# Patient Record
Sex: MALE | Race: WHITE | URBAN - METROPOLITAN AREA
[De-identification: names, ages, dates, MRNs, and addresses within clinical notes are randomized per-mention and may not be internally consistent; named-entity substitution may affect disease eponyms.]

---

## 2023-11-15 PROBLEM — H61.23 IMPACTED CERUMEN OF BOTH EARS: Status: ACTIVE | Noted: 2023-11-15

## 2023-11-15 PROBLEM — E78.00 PURE HYPERCHOLESTEROLEMIA: Status: ACTIVE | Noted: 2023-11-15

## 2023-11-15 NOTE — PROGRESS NOTES
No oropharyngeal exudate or posterior oropharyngeal erythema. Eyes:      Extraocular Movements: Extraocular movements intact. Pupils: Pupils are equal, round, and reactive to light. Cardiovascular:      Rate and Rhythm: Normal rate and regular rhythm. Pulses: Normal pulses. Pulmonary:      Effort: Pulmonary effort is normal.      Breath sounds: Normal breath sounds. Abdominal:      General: Abdomen is flat. Palpations: Abdomen is soft. There is no mass. Musculoskeletal:         General: Normal range of motion. Cervical back: Neck supple. No tenderness. Skin:     General: Skin is warm and dry. Comments: Dry spot with no pigment on the right alar plate of the nose. Slightly scaly no vascularity   Neurological:      General: No focal deficit present. Mental Status: He is alert and oriented to person, place, and time. Psychiatric:         Mood and Affect: Mood normal.         Behavior: Behavior normal.         Thought Content: Thought content normal.         Judgment: Judgment normal.                 Please note that parts of this chart were generated using voice recognition Dragon dictation software. Although every effort was made to ensure the accuracy of this automated transcription, some errors in transcription may have occurred.     Electronically signed by Clementina Wright MD on 11/20/2023 at 10:19 AM

## 2023-11-20 ENCOUNTER — PATIENT MESSAGE (OUTPATIENT)
Age: 63
End: 2023-11-20

## 2023-11-20 ENCOUNTER — OFFICE VISIT (OUTPATIENT)
Age: 63
End: 2023-11-20
Payer: COMMERCIAL

## 2023-11-20 VITALS
HEIGHT: 70 IN | RESPIRATION RATE: 16 BRPM | TEMPERATURE: 97.7 F | WEIGHT: 177 LBS | HEART RATE: 77 BPM | BODY MASS INDEX: 25.34 KG/M2 | SYSTOLIC BLOOD PRESSURE: 113 MMHG | DIASTOLIC BLOOD PRESSURE: 65 MMHG

## 2023-11-20 DIAGNOSIS — Z00.00 VISIT FOR WELL MAN HEALTH CHECK: ICD-10-CM

## 2023-11-20 DIAGNOSIS — Z23 NEED FOR IMMUNIZATION AGAINST INFLUENZA: Primary | ICD-10-CM

## 2023-11-20 DIAGNOSIS — E78.00 PURE HYPERCHOLESTEROLEMIA: ICD-10-CM

## 2023-11-20 DIAGNOSIS — Z12.11 SCREEN FOR COLON CANCER: ICD-10-CM

## 2023-11-20 PROCEDURE — 90471 IMMUNIZATION ADMIN: CPT | Performed by: FAMILY MEDICINE

## 2023-11-20 PROCEDURE — 90686 IIV4 VACC NO PRSV 0.5 ML IM: CPT | Performed by: FAMILY MEDICINE

## 2023-11-20 PROCEDURE — 99396 PREV VISIT EST AGE 40-64: CPT | Performed by: FAMILY MEDICINE

## 2023-11-20 PROCEDURE — 99212 OFFICE O/P EST SF 10 MIN: CPT | Performed by: FAMILY MEDICINE

## 2023-11-20 RX ORDER — ATORVASTATIN CALCIUM 20 MG/1
20 TABLET, FILM COATED ORAL DAILY
Qty: 90 TABLET | Refills: 3 | Status: SHIPPED | OUTPATIENT
Start: 2023-11-20 | End: 2023-11-21 | Stop reason: SDUPTHER

## 2023-11-20 RX ORDER — ATORVASTATIN CALCIUM 20 MG/1
20 TABLET, FILM COATED ORAL DAILY
COMMUNITY
End: 2023-11-20 | Stop reason: SDUPTHER

## 2023-11-20 SDOH — ECONOMIC STABILITY: FOOD INSECURITY: WITHIN THE PAST 12 MONTHS, YOU WORRIED THAT YOUR FOOD WOULD RUN OUT BEFORE YOU GOT MONEY TO BUY MORE.: NEVER TRUE

## 2023-11-20 SDOH — ECONOMIC STABILITY: HOUSING INSECURITY
IN THE LAST 12 MONTHS, WAS THERE A TIME WHEN YOU DID NOT HAVE A STEADY PLACE TO SLEEP OR SLEPT IN A SHELTER (INCLUDING NOW)?: NO

## 2023-11-20 SDOH — ECONOMIC STABILITY: INCOME INSECURITY: HOW HARD IS IT FOR YOU TO PAY FOR THE VERY BASICS LIKE FOOD, HOUSING, MEDICAL CARE, AND HEATING?: NOT HARD AT ALL

## 2023-11-20 SDOH — ECONOMIC STABILITY: FOOD INSECURITY: WITHIN THE PAST 12 MONTHS, THE FOOD YOU BOUGHT JUST DIDN'T LAST AND YOU DIDN'T HAVE MONEY TO GET MORE.: NEVER TRUE

## 2023-11-20 ASSESSMENT — ENCOUNTER SYMPTOMS
COUGH: 0
CHEST TIGHTNESS: 0
ABDOMINAL PAIN: 0
DIARRHEA: 0
BLOOD IN STOOL: 0
VOMITING: 0
CONSTIPATION: 0

## 2023-11-20 ASSESSMENT — PATIENT HEALTH QUESTIONNAIRE - PHQ9
SUM OF ALL RESPONSES TO PHQ QUESTIONS 1-9: 0
1. LITTLE INTEREST OR PLEASURE IN DOING THINGS: 0
SUM OF ALL RESPONSES TO PHQ QUESTIONS 1-9: 0
SUM OF ALL RESPONSES TO PHQ QUESTIONS 1-9: 0
SUM OF ALL RESPONSES TO PHQ9 QUESTIONS 1 & 2: 0
2. FEELING DOWN, DEPRESSED OR HOPELESS: 0
SUM OF ALL RESPONSES TO PHQ QUESTIONS 1-9: 0

## 2023-11-20 NOTE — TELEPHONE ENCOUNTER
From: Angie Luna  To: Dr. Nba Zee: 11/20/2023 11:20 AM EST  Subject: Need Atorvastatin Refill    Hi Dr. Claudia Herbert,    My 1 year prescription for Atorvastatin is up in a few days and I need this prescription renewed. I mentioned it to the nurse, but I don't think I mentioned it to you and do not see anything in my after visit notes about it. Please renew this prescription. (Or let me know if you did and sent it directly to my Pharmacy)    Thanks!     Lb Fisher

## 2023-11-20 NOTE — PATIENT INSTRUCTIONS
I will see you back in 1 year. The cologuard test is every 3 years! Keep an eye on the lesion on your nose any itching or bleeding or enlargement, let me know. Next year we will repeat all your lab tests. I will report back to you once I get lab results this year. Please remember to send me a note through [facility]DA when you have the lab done.

## 2023-11-21 RX ORDER — ATORVASTATIN CALCIUM 20 MG/1
20 TABLET, FILM COATED ORAL DAILY
Qty: 90 TABLET | Refills: 3 | Status: SHIPPED | OUTPATIENT
Start: 2023-11-21

## 2023-11-29 DIAGNOSIS — E78.00 PURE HYPERCHOLESTEROLEMIA: ICD-10-CM

## 2023-12-03 LAB — NONINV COLON CA DNA+OCC BLD SCRN STL QL: NEGATIVE

## 2024-02-06 ENCOUNTER — OFFICE VISIT (OUTPATIENT)
Age: 64
End: 2024-02-06
Payer: COMMERCIAL

## 2024-02-06 VITALS
WEIGHT: 175 LBS | SYSTOLIC BLOOD PRESSURE: 138 MMHG | BODY MASS INDEX: 25.11 KG/M2 | DIASTOLIC BLOOD PRESSURE: 71 MMHG | TEMPERATURE: 98.1 F | RESPIRATION RATE: 16 BRPM

## 2024-02-06 DIAGNOSIS — H61.23 IMPACTED CERUMEN OF BOTH EARS: ICD-10-CM

## 2024-02-06 DIAGNOSIS — E78.00 PURE HYPERCHOLESTEROLEMIA: ICD-10-CM

## 2024-02-06 DIAGNOSIS — G93.31 POST VIRAL SYNDROME: Primary | ICD-10-CM

## 2024-02-06 DIAGNOSIS — Z78.9 NONSMOKER: ICD-10-CM

## 2024-02-06 PROCEDURE — 99212 OFFICE O/P EST SF 10 MIN: CPT

## 2024-02-06 PROCEDURE — 69210 REMOVE IMPACTED EAR WAX UNI: CPT

## 2024-02-06 NOTE — PATIENT INSTRUCTIONS
Follow up in 2 weeks Post Viral Sinusitis  If you continue to have congestion and the over the counter medications are not working please call and let us know   I would recommend saline nasal flush and rinse out the nasal sinus  Refrain from using the sudafed as this can dry out the mucus and make it worse.   Flushed your ears today. Post flush ears look great  Please call if you have any questions  Thank you for your visit today!

## 2024-02-06 NOTE — PROGRESS NOTES
Procedure Documentation:  Procedure:     Ear Irrigation/instrumented removal of cerumen   Location:       bilateral Ear Canals  Reason:         Impacted Wax/Cerumen  Note:             Patients ear(s) was/were irrigated with warm tap water and peroxide using Waterpik.   I was able to remove the wax/cerumen completely.   Patient tolerated the procedure well.  Pt instructions: continue current home treatments   Done by Vandana Russ MA   Checked by Dr Brothers.  
diaphoretic.   HENT:      Head: Normocephalic and atraumatic.      Right Ear: There is impacted cerumen.      Left Ear: There is impacted cerumen.      Ears:      Comments: Post irrigation, clear external canal with no erythema, there is very minimal fluid behind tympanic membrane is clear in nature.     Nose: No congestion or rhinorrhea.      Mouth/Throat:      Pharynx: No oropharyngeal exudate.   Eyes:      Extraocular Movements: Extraocular movements intact.      Conjunctiva/sclera: Conjunctivae normal.   Cardiovascular:      Rate and Rhythm: Normal rate.      Heart sounds: No murmur heard.     No friction rub. No gallop.   Pulmonary:      Breath sounds: Normal breath sounds. No wheezing, rhonchi or rales.   Musculoskeletal:      Right lower leg: No edema.      Left lower leg: No edema.   Neurological:      Mental Status: He is alert.   Psychiatric:         Mood and Affect: Mood normal.         Behavior: Behavior normal.         ASSESSMENT/PLAN     1. Post viral syndrome  2. Nonsmoker  3. Impacted cerumen of both ears  Follow up in 2 weeks Post Viral Sinusitis  If you continue to have congestion and the over the counter medications are not working please call and let us know   I would recommend saline nasal flush and rinse out the nasal sinus  Refrain from using the sudafed as this can dry out the mucus and make it worse.   Flushed your ears today. Post flush ears look great  Please call if you have any questions  Thank you for your visit today!    Return in about 2 weeks (around 2/20/2024) for Post Viral Sinusitis.    - Questions/concerns answered. Patient verbalized and expressed understanding. Medications, laboratory testing, imaging, consultation, and follow up as documented in this encounter.     Please be aware portions of this note were completed using voice recognition software and unforeseen errors may have occurred    Patient was discussed with Dr. Mckeon    Electronically signed by Bertin Brothers MD

## 2024-02-08 ASSESSMENT — ENCOUNTER SYMPTOMS
COUGH: 0
TROUBLE SWALLOWING: 0
VOICE CHANGE: 0
SINUS PAIN: 0
VOMITING: 0
DIARRHEA: 0
CONSTIPATION: 0
SINUS PRESSURE: 1
NAUSEA: 0
RHINORRHEA: 0
SHORTNESS OF BREATH: 0
SORE THROAT: 0
ABDOMINAL PAIN: 0
BLOOD IN STOOL: 0

## 2024-10-27 RX ORDER — ATORVASTATIN CALCIUM 20 MG/1
20 TABLET, FILM COATED ORAL DAILY
Qty: 90 TABLET | Refills: 3 | Status: SHIPPED | OUTPATIENT
Start: 2024-10-27

## 2025-01-02 PROBLEM — J30.9 ATOPIC RHINITIS: Status: ACTIVE | Noted: 2017-02-01

## 2025-01-02 RX ORDER — CETIRIZINE HYDROCHLORIDE 10 MG/1
10 TABLET, CHEWABLE ORAL DAILY
COMMUNITY

## 2025-01-02 NOTE — PROGRESS NOTES
[facility]  [address] [place] [address]  Phone: [phone]  Fax: [phone]      Date of Visit:  2025  Patient Name: Simon Loya   Patient :  1960     Assessment/Plan:     1. Pure hypercholesterolemia  2. Visit for well man health check       Plan:    BMI was elevated today, and weight loss plan recommended is lifestyle modification.            There are no Patient Instructions on file for this visit.     Requested Prescriptions      No prescriptions requested or ordered in this encounter       There are no discontinued medications.    No follow-ups on file.    Simon was given educational materials: See patient instructions. Discussed use, benefit, and side effects of prescribed medications.  Barriers to medication compliance addressed. All patient questions answered.  Pt voiced understanding.     Subjective:      HPI    Simon Loya is a 64 y.o. male with Hx of  has a past medical history of Adult acne, Allergic rhinitis, Dysuria, elevated blood pressure, Groin abscess, Hyperlipidemia, Inguinal ring laxity, Intermittent low back pain, and UTI (urinary tract infection). who presents to clinic with due to   Chief Complaint   Patient presents with    Annual Exam     PE-         Patient comes in for annual wellness exam.  He was seen in February of last year for impacted wax.    My last annual visit was 2023.  At that time we addressed hypercholesterolemia with lifestyle changes and did begin a cholesterol-lowering agent.  We also did screening for colon cancer.    Labs done 2023 showed glucose of 96, normal kidney function with BUN 17 creatinine 0.74 normal serum electrolytes EGFR was 107.    Patient has been retired for a few years continues to be active with dancing on a weekly basis.    Preventative Services:           The following portions of the patients's history were reviewed and updated as

## 2025-01-06 ENCOUNTER — OFFICE VISIT (OUTPATIENT)
Age: 65
End: 2025-01-06
Payer: COMMERCIAL

## 2025-01-06 VITALS
RESPIRATION RATE: 16 BRPM | TEMPERATURE: 98 F | DIASTOLIC BLOOD PRESSURE: 78 MMHG | HEART RATE: 70 BPM | BODY MASS INDEX: 25.91 KG/M2 | SYSTOLIC BLOOD PRESSURE: 151 MMHG | HEIGHT: 70 IN | WEIGHT: 181 LBS

## 2025-01-06 DIAGNOSIS — R03.0 ELEVATED BLOOD PRESSURE READING: ICD-10-CM

## 2025-01-06 DIAGNOSIS — E78.00 PURE HYPERCHOLESTEROLEMIA: ICD-10-CM

## 2025-01-06 DIAGNOSIS — Z23 NEED FOR TD VACCINE: Primary | ICD-10-CM

## 2025-01-06 DIAGNOSIS — Z00.00 VISIT FOR WELL MAN HEALTH CHECK: ICD-10-CM

## 2025-01-06 PROCEDURE — 90714 TD VACC NO PRESV 7 YRS+ IM: CPT | Performed by: FAMILY MEDICINE

## 2025-01-06 PROCEDURE — 99396 PREV VISIT EST AGE 40-64: CPT | Performed by: FAMILY MEDICINE

## 2025-01-06 PROCEDURE — 99212 OFFICE O/P EST SF 10 MIN: CPT | Performed by: FAMILY MEDICINE

## 2025-01-06 SDOH — ECONOMIC STABILITY: FOOD INSECURITY: WITHIN THE PAST 12 MONTHS, YOU WORRIED THAT YOUR FOOD WOULD RUN OUT BEFORE YOU GOT MONEY TO BUY MORE.: NEVER TRUE

## 2025-01-06 SDOH — ECONOMIC STABILITY: INCOME INSECURITY: HOW HARD IS IT FOR YOU TO PAY FOR THE VERY BASICS LIKE FOOD, HOUSING, MEDICAL CARE, AND HEATING?: NOT HARD AT ALL

## 2025-01-06 SDOH — ECONOMIC STABILITY: FOOD INSECURITY: WITHIN THE PAST 12 MONTHS, THE FOOD YOU BOUGHT JUST DIDN'T LAST AND YOU DIDN'T HAVE MONEY TO GET MORE.: NEVER TRUE

## 2025-01-06 ASSESSMENT — PATIENT HEALTH QUESTIONNAIRE - PHQ9
SUM OF ALL RESPONSES TO PHQ9 QUESTIONS 1 & 2: 0
SUM OF ALL RESPONSES TO PHQ QUESTIONS 1-9: 0
SUM OF ALL RESPONSES TO PHQ QUESTIONS 1-9: 0
2. FEELING DOWN, DEPRESSED OR HOPELESS: NOT AT ALL
SUM OF ALL RESPONSES TO PHQ QUESTIONS 1-9: 0
1. LITTLE INTEREST OR PLEASURE IN DOING THINGS: NOT AT ALL
SUM OF ALL RESPONSES TO PHQ QUESTIONS 1-9: 0

## 2025-01-06 NOTE — PATIENT INSTRUCTIONS
Great to see you today.  You did gain a few pounds from 175 to 181 pounds however your BMI is 26 so you still are in a good range.  This little bit of weight gain may have caused her pressure to go up as well as salt.  Please keep track of your pressures maybe once or twice a week and send me 3 or 4 measurements.    With you being low risk I did not order a PSA.  Continue ear care as you have been doing.    I will send you a note with lab results or give you a call if things are abnormal.    Patient Education        DASH Diet: Care Instructions  Your Care Instructions     The DASH diet is an eating plan that can help lower your blood pressure. DASH stands for Dietary Approaches to Stop Hypertension. Hypertension is high blood pressure.  The DASH diet focuses on eating foods that are high in calcium, potassium, and magnesium. These nutrients can lower blood pressure. The foods that are highest in these nutrients are fruits, vegetables, low-fat dairy products, nuts, seeds, and legumes. But taking calcium, potassium, and magnesium supplements instead of eating foods that are high in those nutrients does not have the same effect. The DASH diet also includes whole grains, fish, and poultry.  The DASH diet is one of several lifestyle changes your doctor may recommend to lower your high blood pressure. Your doctor may also want you to decrease the amount of sodium in your diet. Lowering sodium while following the DASH diet can lower blood pressure even further than just the DASH diet alone.  Follow-up care is a key part of your treatment and safety. Be sure to make and go to all appointments, and call your doctor if you are having problems. It's also a good idea to know your test results and keep a list of the medicines you take.  How can you care for yourself at home?  Following the DASH diet  Eat 4 to 5 servings of fruit each day. A serving is 1 medium-sized piece of fruit, ½ cup chopped or canned fruit, 1/4 cup dried

## 2025-01-08 ENCOUNTER — TELEPHONE (OUTPATIENT)
Age: 65
End: 2025-01-08

## 2025-01-08 DIAGNOSIS — E78.00 PURE HYPERCHOLESTEROLEMIA: ICD-10-CM

## 2025-01-08 NOTE — TELEPHONE ENCOUNTER
Vy,    Can we check the patient's arc for E clinical works.  I would like to see if I did a PSA level on him in the past and if so please get that records so we can scanned it into this chart.    Thank you

## 2025-01-24 ENCOUNTER — PATIENT MESSAGE (OUTPATIENT)
Age: 65
End: 2025-01-24

## 2025-01-27 ENCOUNTER — TELEPHONE (OUTPATIENT)
Age: 65
End: 2025-01-27

## 2025-01-27 NOTE — TELEPHONE ENCOUNTER
Spoke with pt, says hard to gt a BP reading because he starts to get a panic attack and anxious of what the reading will be...    Last week he did have one of 135/87 with heart rates of 89, 91, 93    Today it was 176/96 with heart rate of 103 but that was early morning and he couldn't get any more readings, too anxious...    Please advise

## 2025-01-27 NOTE — TELEPHONE ENCOUNTER
Asking for Dr Mei to call him.  States he feels like he has elevated heart rate for the last week. States when he checks his rate is in the 90's bu he feels like it is beating harder. No chest pain. Wondering if anxiety.

## 2025-01-28 NOTE — PROGRESS NOTES
[facility]  [address] [place] [address]  Phone: [phone]  Fax: [phone]      Date of Visit:  2025  Patient Name: Simon Loya   Patient :  1960     HPI:     Simon Loya is a 64 y.o. male who presents today to discuss   Chief Complaint   Patient presents with    Blood Pressure Check     Blood pressure monitor at home brought in today. Has a wrist cuff. Patient has log will copy. Heart is racing he is concerned with the outcome of this matter. Patient feels he has panic attack or anxiety because of his blood pressure is elevated. Patient is wondering if this also could be because of Shingles vaccine he had on 01/15/2025. KP     Feels like he has increased HR and blood pressure.  Patient brought in blood pressure cuff matched pretty close to our offices. Started around a week ago every time patient goes to take his blood pressure he feels very anxious and starts feeling his pulse race.  He was only noticing this when he was feeling anxious from taking his blood pressure.  Also due to his anxiety his blood pressure was also elevated at that time.     Patient denies chest pain, shortness of breath, palpitations, leg swelling, or history of any cardiac illnesses.  Patient is able to exercise and was ballroom dancing yesterday without difficulty.  He feels overall there is not much stress in his life besides the blood pressure recently and has no acute stressors that he knows about.  He does state he is a worrier.  Educated patient on techniques to decrease anxiety.    He is also the last few nights been waking up from his sleep feeling anxious as well.  He says when he wakes up he is not short of breath, sweating, or having any pain.  He just knows his heart rate is elevated and takes him a while to go back to sleep.    I personally reviewed the patient's past medical history, current medications, allergies, surgical

## 2025-01-29 ENCOUNTER — OFFICE VISIT (OUTPATIENT)
Age: 65
End: 2025-01-29
Payer: COMMERCIAL

## 2025-01-29 VITALS
RESPIRATION RATE: 16 BRPM | HEIGHT: 70 IN | SYSTOLIC BLOOD PRESSURE: 200 MMHG | BODY MASS INDEX: 25.54 KG/M2 | TEMPERATURE: 97.9 F | DIASTOLIC BLOOD PRESSURE: 90 MMHG | HEART RATE: 100 BPM | WEIGHT: 178.4 LBS

## 2025-01-29 DIAGNOSIS — R45.89 ANXIETY ABOUT HEALTH: ICD-10-CM

## 2025-01-29 DIAGNOSIS — R03.0 ELEVATED BLOOD PRESSURE READING: Primary | ICD-10-CM

## 2025-01-29 PROCEDURE — 99213 OFFICE O/P EST LOW 20 MIN: CPT

## 2025-01-29 PROCEDURE — 99211 OFF/OP EST MAY X REQ PHY/QHP: CPT

## 2025-01-29 SDOH — ECONOMIC STABILITY: FOOD INSECURITY: WITHIN THE PAST 12 MONTHS, THE FOOD YOU BOUGHT JUST DIDN'T LAST AND YOU DIDN'T HAVE MONEY TO GET MORE.: NEVER TRUE

## 2025-01-29 SDOH — ECONOMIC STABILITY: INCOME INSECURITY: IN THE LAST 12 MONTHS, WAS THERE A TIME WHEN YOU WERE NOT ABLE TO PAY THE MORTGAGE OR RENT ON TIME?: NO

## 2025-01-29 SDOH — ECONOMIC STABILITY: FOOD INSECURITY: WITHIN THE PAST 12 MONTHS, YOU WORRIED THAT YOUR FOOD WOULD RUN OUT BEFORE YOU GOT MONEY TO BUY MORE.: NEVER TRUE

## 2025-01-29 SDOH — ECONOMIC STABILITY: TRANSPORTATION INSECURITY
IN THE PAST 12 MONTHS, HAS THE LACK OF TRANSPORTATION KEPT YOU FROM MEDICAL APPOINTMENTS OR FROM GETTING MEDICATIONS?: NO

## 2025-01-29 SDOH — ECONOMIC STABILITY: TRANSPORTATION INSECURITY
IN THE PAST 12 MONTHS, HAS LACK OF TRANSPORTATION KEPT YOU FROM MEETINGS, WORK, OR FROM GETTING THINGS NEEDED FOR DAILY LIVING?: NO

## 2025-01-29 ASSESSMENT — PATIENT HEALTH QUESTIONNAIRE - PHQ9
SUM OF ALL RESPONSES TO PHQ QUESTIONS 1-9: 1
4. FEELING TIRED OR HAVING LITTLE ENERGY: NOT AT ALL
2. FEELING DOWN, DEPRESSED OR HOPELESS: NOT AT ALL
SUM OF ALL RESPONSES TO PHQ QUESTIONS 1-9: 1
3. TROUBLE FALLING OR STAYING ASLEEP: SEVERAL DAYS
8. MOVING OR SPEAKING SO SLOWLY THAT OTHER PEOPLE COULD HAVE NOTICED. OR THE OPPOSITE, BEING SO FIGETY OR RESTLESS THAT YOU HAVE BEEN MOVING AROUND A LOT MORE THAN USUAL: NOT AT ALL
SUM OF ALL RESPONSES TO PHQ9 QUESTIONS 1 & 2: 0
SUM OF ALL RESPONSES TO PHQ QUESTIONS 1-9: 1
9. THOUGHTS THAT YOU WOULD BE BETTER OFF DEAD, OR OF HURTING YOURSELF: NOT AT ALL
5. POOR APPETITE OR OVEREATING: NOT AT ALL
7. TROUBLE CONCENTRATING ON THINGS, SUCH AS READING THE NEWSPAPER OR WATCHING TELEVISION: NOT AT ALL
1. LITTLE INTEREST OR PLEASURE IN DOING THINGS: NOT AT ALL
6. FEELING BAD ABOUT YOURSELF - OR THAT YOU ARE A FAILURE OR HAVE LET YOURSELF OR YOUR FAMILY DOWN: NOT AT ALL
10. IF YOU CHECKED OFF ANY PROBLEMS, HOW DIFFICULT HAVE THESE PROBLEMS MADE IT FOR YOU TO DO YOUR WORK, TAKE CARE OF THINGS AT HOME, OR GET ALONG WITH OTHER PEOPLE: SOMEWHAT DIFFICULT
SUM OF ALL RESPONSES TO PHQ QUESTIONS 1-9: 1

## 2025-01-29 ASSESSMENT — ANXIETY QUESTIONNAIRES
3. WORRYING TOO MUCH ABOUT DIFFERENT THINGS: MORE THAN HALF THE DAYS
5. BEING SO RESTLESS THAT IT IS HARD TO SIT STILL: NOT AT ALL
GAD7 TOTAL SCORE: 10
IF YOU CHECKED OFF ANY PROBLEMS ON THIS QUESTIONNAIRE, HOW DIFFICULT HAVE THESE PROBLEMS MADE IT FOR YOU TO DO YOUR WORK, TAKE CARE OF THINGS AT HOME, OR GET ALONG WITH OTHER PEOPLE: SOMEWHAT DIFFICULT
1. FEELING NERVOUS, ANXIOUS, OR ON EDGE: MORE THAN HALF THE DAYS
2. NOT BEING ABLE TO STOP OR CONTROL WORRYING: MORE THAN HALF THE DAYS
4. TROUBLE RELAXING: MORE THAN HALF THE DAYS
6. BECOMING EASILY ANNOYED OR IRRITABLE: NOT AT ALL
7. FEELING AFRAID AS IF SOMETHING AWFUL MIGHT HAPPEN: MORE THAN HALF THE DAYS

## 2025-01-29 NOTE — PATIENT INSTRUCTIONS
Thank you for following up with us at [facility] outpatient residency clinic! It was a pleasure to meet you today!     Our plan is the following:  - likely anxiety is the cause of your increased blood pressure.   - I will put in a referral for behavioral health at this time.     If you have any additional questions or concerns, please call the office ([phone]) and speak to one of the staff. They will triage and forward the message to the doctors! Have a great rest of your day!

## 2025-03-11 NOTE — PROGRESS NOTES
[facility]  [address] [place] [address]  Phone: [phone]  Fax: [phone]      Date of Visit:  3/12/2025  Patient Name: Simon Loya   Patient :  1960     Assessment/Plan:     1. Elevated blood pressure reading  Comments:  Anxiety versus true hypertension EKG and TSH being done and workup.  Normal labs  Orders:  -     TSH reflex to FT4; Future  -     EKG 12 Lead  2. Situational anxiety  Comments:  Still working with CBT as well as exercise.  Affecting sleep and discussed melatonin as well as sleep mask  3. Non-smoker  4. Palpitations  Comments:  Probably related to anxiety.  Orders:  -     TSH reflex to FT4; Future  -     EKG 12 Lead       Plan:         There are no Patient Instructions on file for this visit.     Requested Prescriptions     Signed Prescriptions Disp Refills    busPIRone (BUSPAR) 5 MG tablet 60 tablet 0     Si-2 as needed up to 3 times a day    cetirizine (ZYRTEC) 10 MG chewable tablet 30 tablet 0     Sig: Take 1 tablet by mouth daily As needed , usually the Fall-       Medications Discontinued During This Encounter   Medication Reason    cetirizine (ZYRTEC) 10 MG chewable tablet DOSE ADJUSTMENT       No follow-ups on file.    Simon was given educational materials: See patient instructions. Discussed use, benefit, and side effects of prescribed medications.  Barriers to medication compliance addressed. All patient questions answered.  Pt voiced understanding.     Subjective:      HPI    Simon Loya is a 64 y.o. male with Hx of  has a past medical history of Adult acne, Allergic rhinitis, Dysuria, elevated blood pressure, Groin abscess, Hyperlipidemia, Inguinal ring laxity, Intermittent low back pain, and UTI (urinary tract infection). who presents to clinic with due to   Chief Complaint   Patient presents with    Follow-up     BP and anxiety         Patient comes in today for elevated blood

## 2025-03-12 ENCOUNTER — OFFICE VISIT (OUTPATIENT)
Age: 65
End: 2025-03-12
Payer: COMMERCIAL

## 2025-03-12 VITALS
SYSTOLIC BLOOD PRESSURE: 168 MMHG | WEIGHT: 173 LBS | RESPIRATION RATE: 16 BRPM | HEIGHT: 70 IN | TEMPERATURE: 98.2 F | HEART RATE: 90 BPM | BODY MASS INDEX: 24.77 KG/M2 | DIASTOLIC BLOOD PRESSURE: 82 MMHG

## 2025-03-12 DIAGNOSIS — R03.0 ELEVATED BLOOD PRESSURE READING: Primary | ICD-10-CM

## 2025-03-12 DIAGNOSIS — F41.8 SITUATIONAL ANXIETY: ICD-10-CM

## 2025-03-12 DIAGNOSIS — Z78.9 NON-SMOKER: ICD-10-CM

## 2025-03-12 DIAGNOSIS — R00.2 PALPITATIONS: ICD-10-CM

## 2025-03-12 PROCEDURE — 93010 ELECTROCARDIOGRAM REPORT: CPT | Performed by: FAMILY MEDICINE

## 2025-03-12 PROCEDURE — 99212 OFFICE O/P EST SF 10 MIN: CPT | Performed by: FAMILY MEDICINE

## 2025-03-12 PROCEDURE — 99213 OFFICE O/P EST LOW 20 MIN: CPT | Performed by: FAMILY MEDICINE

## 2025-03-12 PROCEDURE — 93005 ELECTROCARDIOGRAM TRACING: CPT | Performed by: FAMILY MEDICINE

## 2025-03-12 RX ORDER — CETIRIZINE HYDROCHLORIDE 10 MG/1
10 TABLET, CHEWABLE ORAL DAILY
Qty: 30 TABLET | Refills: 0 | Status: SHIPPED
Start: 2025-03-12

## 2025-03-12 RX ORDER — BUSPIRONE HYDROCHLORIDE 5 MG/1
TABLET ORAL
Qty: 60 TABLET | Refills: 0 | Status: SHIPPED | OUTPATIENT
Start: 2025-03-12

## 2025-03-12 ASSESSMENT — PATIENT HEALTH QUESTIONNAIRE - PHQ9
10. IF YOU CHECKED OFF ANY PROBLEMS, HOW DIFFICULT HAVE THESE PROBLEMS MADE IT FOR YOU TO DO YOUR WORK, TAKE CARE OF THINGS AT HOME, OR GET ALONG WITH OTHER PEOPLE: NOT DIFFICULT AT ALL
6. FEELING BAD ABOUT YOURSELF - OR THAT YOU ARE A FAILURE OR HAVE LET YOURSELF OR YOUR FAMILY DOWN: NOT AT ALL
8. MOVING OR SPEAKING SO SLOWLY THAT OTHER PEOPLE COULD HAVE NOTICED. OR THE OPPOSITE, BEING SO FIGETY OR RESTLESS THAT YOU HAVE BEEN MOVING AROUND A LOT MORE THAN USUAL: NOT AT ALL
SUM OF ALL RESPONSES TO PHQ QUESTIONS 1-9: 3
4. FEELING TIRED OR HAVING LITTLE ENERGY: SEVERAL DAYS
3. TROUBLE FALLING OR STAYING ASLEEP: MORE THAN HALF THE DAYS
9. THOUGHTS THAT YOU WOULD BE BETTER OFF DEAD, OR OF HURTING YOURSELF: NOT AT ALL
1. LITTLE INTEREST OR PLEASURE IN DOING THINGS: NOT AT ALL
7. TROUBLE CONCENTRATING ON THINGS, SUCH AS READING THE NEWSPAPER OR WATCHING TELEVISION: NOT AT ALL
SUM OF ALL RESPONSES TO PHQ QUESTIONS 1-9: 3
2. FEELING DOWN, DEPRESSED OR HOPELESS: NOT AT ALL
5. POOR APPETITE OR OVEREATING: NOT AT ALL
SUM OF ALL RESPONSES TO PHQ QUESTIONS 1-9: 3
SUM OF ALL RESPONSES TO PHQ QUESTIONS 1-9: 3

## 2025-03-12 ASSESSMENT — ANXIETY QUESTIONNAIRES
2. NOT BEING ABLE TO STOP OR CONTROL WORRYING: NOT AT ALL
1. FEELING NERVOUS, ANXIOUS, OR ON EDGE: SEVERAL DAYS
3. WORRYING TOO MUCH ABOUT DIFFERENT THINGS: SEVERAL DAYS
5. BEING SO RESTLESS THAT IT IS HARD TO SIT STILL: NOT AT ALL
6. BECOMING EASILY ANNOYED OR IRRITABLE: NOT AT ALL
7. FEELING AFRAID AS IF SOMETHING AWFUL MIGHT HAPPEN: NOT AT ALL
GAD7 TOTAL SCORE: 2
IF YOU CHECKED OFF ANY PROBLEMS ON THIS QUESTIONNAIRE, HOW DIFFICULT HAVE THESE PROBLEMS MADE IT FOR YOU TO DO YOUR WORK, TAKE CARE OF THINGS AT HOME, OR GET ALONG WITH OTHER PEOPLE: NOT DIFFICULT AT ALL
4. TROUBLE RELAXING: NOT AT ALL

## 2025-03-14 LAB — TSH SERPL DL<=0.05 MIU/L-ACNC: 2.07 UIU/ML

## 2025-03-17 ENCOUNTER — RESULTS FOLLOW-UP (OUTPATIENT)
Age: 65
End: 2025-03-17

## 2025-03-17 DIAGNOSIS — R03.0 ELEVATED BLOOD PRESSURE READING: ICD-10-CM

## 2025-03-17 DIAGNOSIS — R00.2 PALPITATIONS: ICD-10-CM

## 2025-03-18 ENCOUNTER — OFFICE VISIT (OUTPATIENT)
Age: 65
End: 2025-03-18
Payer: COMMERCIAL

## 2025-03-18 DIAGNOSIS — F41.8 SITUATIONAL ANXIETY: Primary | ICD-10-CM

## 2025-03-18 PROCEDURE — 90791 PSYCH DIAGNOSTIC EVALUATION: CPT | Performed by: PSYCHOLOGIST

## 2025-03-19 RX ORDER — BUSPIRONE HYDROCHLORIDE 5 MG/1
TABLET ORAL
Qty: 60 TABLET | Refills: 0 | OUTPATIENT
Start: 2025-03-19

## 2025-03-20 ENCOUNTER — PROCEDURE VISIT (OUTPATIENT)
Age: 65
End: 2025-03-20
Payer: COMMERCIAL

## 2025-03-20 VITALS
SYSTOLIC BLOOD PRESSURE: 160 MMHG | RESPIRATION RATE: 16 BRPM | HEART RATE: 80 BPM | WEIGHT: 171.4 LBS | DIASTOLIC BLOOD PRESSURE: 80 MMHG | BODY MASS INDEX: 24.54 KG/M2 | TEMPERATURE: 97.5 F | HEIGHT: 70 IN

## 2025-03-20 DIAGNOSIS — G89.29 CHRONIC MIDLINE LOW BACK PAIN WITHOUT SCIATICA: Primary | ICD-10-CM

## 2025-03-20 DIAGNOSIS — M99.03 SOMATIC DYSFUNCTION OF LUMBAR REGION: ICD-10-CM

## 2025-03-20 DIAGNOSIS — M54.50 CHRONIC MIDLINE LOW BACK PAIN WITHOUT SCIATICA: Primary | ICD-10-CM

## 2025-03-20 DIAGNOSIS — Z78.9 NONSMOKER: ICD-10-CM

## 2025-03-20 DIAGNOSIS — M99.08 SOMATIC DYSFUNCTION OF RIB CAGE REGION: ICD-10-CM

## 2025-03-20 DIAGNOSIS — M99.00 SOMATIC DYSFUNCTION OF HEAD REGION: ICD-10-CM

## 2025-03-20 DIAGNOSIS — M99.01 SOMATIC DYSFUNCTION OF CERVICAL REGION: ICD-10-CM

## 2025-03-20 DIAGNOSIS — F41.8 SITUATIONAL ANXIETY: ICD-10-CM

## 2025-03-20 DIAGNOSIS — M99.04 SOMATIC DYSFUNCTION OF SACRAL REGION: ICD-10-CM

## 2025-03-20 PROCEDURE — 98927 OSTEOPATH MANJ 5-6 REGIONS: CPT

## 2025-03-20 NOTE — PATIENT INSTRUCTIONS
Thank you for coming in today, it was a pleasure to see you!      These treatments should help with anxiety over the next couple of days. In the meantime, keep focusing on your breathing and continuing to try the boxed breathing you learned with Dr. Felder.  As discussed, you may notice increased soreness or pain in the treated areas today after OMT as metabolites released from tense muscles work their way out of your body; the techniques used today, overall, should not cause too much soreness.    You may take Tylenol or ibuprofen per package directions for pain relief as needed if you are normally able to tolerate those medications. The pain should improve after 24-48 hours.  Be sure to drink plenty of water.     Please continue to stretch and follow any exercises given 1-2 times daily.    You may return for repeat OMT in 2-4 weeks if needed or anytime thereafter.    Thank you!  Please call with any questions or concerns.

## 2025-03-20 NOTE — PROGRESS NOTES
[facility]  [address] [place] [address]  Phone: [phone]  Fax: [phone]  Date of Visit:  3/20/2025  Patient Name: Simon Loya   Patient :  1960     CHIEF COMPLAINT:     Simon Loya is a 64 y.o. male who presents today for an OMT visit to be evaluated for the following condition(s):  Chief Complaint   Patient presents with    Procedure    Anxiety     Patient states that it doesn't matter how much he tries to control his emotions when having his BP checked he stills become anxious. [facility]       HISTORY OF PRESENT ILLNESS     Patient presents for OMT evaluation of anxiety, neck and back pain. He has been very anxious recently and would like treatment for that. He did twist his low back about a week ago but this has since resolved. He denies radicular pain, numbness, tingling. He also denies headache, chest pain, ringing in the ears. He was prescribed Buspar at his last PCP visit, and he had to take 2 of the pills last night to sleep. In fact, his sleep has been very bad recently due to anxiety. This week has been worse for anxiety than usual.    He has started going to exercise classes and yoga. These work his back out a lot, and he has noticed it is a little tighter with these.    Patient denies radiation of pain, numbness or tingling in the extremities, F/C, unintentional weight loss, and saddle anesthesia.    Patient desires to proceed with OMT.    I personally reviewed the patient's past medical history, current medications, allergies, surgical history, family history and social history.  Updates were made as necessary.    REVIEW OF SYSTEM      General: Denies feeling poorly, tired, fever, chills  Cardiovascular: Denies chest pain, lower extremity edema, palpitations  Respiratory: Denies cough, shortness of breath at rest  MSK: Pain and tension per HPI  Neuro: Denies dizziness, headache    REVIEWED INFORMATION

## 2025-03-20 NOTE — PROGRESS NOTES
appear within functional limits    FAMILY MEDICAL/MH HISTORY   His family history includes Anxiety Disorder in his mother and sister; Depression in his brother.    PATIENT MENTAL HEALTH HISTORY  Anxiety    PSYCHOSOCIAL HISTORY  Current living situation: with wife    Work/Education: retired - reported some tension/conflict at work before group home    Support system: wife    Orthodoxy/Spirituality: not assessed    DRUG AND ALCOHOL CURRENT USE/HISTORY  TOBACCO:  He reports that he has never smoked. He has never used smokeless tobacco.  ALCOHOL:  He reports current alcohol use.  OTHER SUBSTANCES: He reports no history of drug use.     ASSESSMENT  Simon Loya presented to the appointment today for evaluation and treatment of symptoms of anxiety.   He will also benefit from brief and solution-focused consultation to address cognitive and behavioral interventions for anxiety symptoms.      DIAGNOSIS    ICD-10-CM    1. Situational anxiety  F41.8            INTERVENTION  Established rapport, discussed AWARE strategy, exposure therapy,     PLAN  Continue cognitive therapy/CBT to address anxiety - referral to OMT for anxiety    Electronically signed by Javier Felder, PhD on 3/20/2025 at 4:55 PM

## 2025-04-08 ENCOUNTER — OFFICE VISIT (OUTPATIENT)
Age: 65
End: 2025-04-08
Payer: COMMERCIAL

## 2025-04-08 DIAGNOSIS — F41.8 SITUATIONAL ANXIETY: Primary | ICD-10-CM

## 2025-04-08 PROCEDURE — 90837 PSYTX W PT 60 MINUTES: CPT | Performed by: PSYCHOLOGIST

## 2025-04-08 NOTE — PROGRESS NOTES
family history includes Anxiety Disorder in his mother and sister; Depression in his brother.    PATIENT MENTAL HEALTH HISTORY  Anxiety    PSYCHOSOCIAL HISTORY  Current living situation: with wife    Work/Education: retired    Support system: wife     Alevism/Spirituality: not assessed    DRUG AND ALCOHOL CURRENT USE/HISTORY  TOBACCO:  He reports that he has never smoked. He has never used smokeless tobacco.  ALCOHOL:  He reports current alcohol use.  OTHER SUBSTANCES: He reports no history of drug use.     ASSESSMENT  Simon Loya presented to the appointment today for evaluation and treatment of symptoms of anxiety.   He will also benefit from brief and solution-focused consultation to address cognitive and behavioral interventions for anxiety symptoms.      DIAGNOSIS    ICD-10-CM    1. Situational anxiety  F41.8            INTERVENTION  Identified relevant behavioral strategies for targeting anxiety including introduced mindful meditation (leaves on a stream), bibliotherapy (10% Happier), introducted AWARE strategy, encouraged continued exercise and relaxation    PLAN  Continue CBT/ACT treatment for anxiety    Electronically signed by Javier Felder, PhD on 4/8/2025 at 4:15 PM

## 2025-04-29 NOTE — PROGRESS NOTES
[facility]  [address] [place] [address]  Phone: [phone]  Fax: [phone]      Date of Visit:  2025  Patient Name: Simon Loya   Patient :  1960     Assessment/Plan:     1. Elevated blood pressure reading  Comments:  We are continuing to work with home blood pressure as well as managing anxiety when taking BP.  Recheck in 3 months 3 blood pressure readings before then       We discussed the pros and cons of holding off on treatment as well as his anxiety and have come to the above conclusion.        Assessment & Plan         Plan:      Patient will schedule me if he has any other questions.    Patient Instructions   Good to see you today.    I am enclosing the blood pressure validation site:    [email or web address]_[email or web address]    If you have any questions, please feel free to message me through [employer].    I would appreciate you taking a midmorning blood pressure, and evening blood pressure and a midday pressure for an average of 3 pressures in 1 week after you get your cuff.    Follow the deep breathing as we did here in the office.  Be sure that you have not had coffee within 30 minutes.    I will see you back in 3 months.     Requested Prescriptions      No prescriptions requested or ordered in this encounter       There are no discontinued medications.    No follow-ups on file.    Simon was given educational materials: See patient instructions. Discussed use, benefit, and side effects of prescribed medications.  Barriers to medication compliance addressed. All patient questions answered.  Pt voiced understanding.     Subjective:      HPI    Simon Loya is a 65 y.o. male with Hx of  has a past medical history of Adult acne, Allergic rhinitis, Anxiety, Dysuria, elevated

## 2025-05-01 ENCOUNTER — OFFICE VISIT (OUTPATIENT)
Age: 65
End: 2025-05-01
Payer: COMMERCIAL

## 2025-05-01 ENCOUNTER — PROCEDURE VISIT (OUTPATIENT)
Age: 65
End: 2025-05-01
Payer: COMMERCIAL

## 2025-05-01 VITALS
SYSTOLIC BLOOD PRESSURE: 144 MMHG | BODY MASS INDEX: 24.62 KG/M2 | TEMPERATURE: 98.2 F | HEIGHT: 70 IN | RESPIRATION RATE: 16 BRPM | WEIGHT: 172 LBS | HEART RATE: 90 BPM | DIASTOLIC BLOOD PRESSURE: 78 MMHG

## 2025-05-01 VITALS
WEIGHT: 172 LBS | SYSTOLIC BLOOD PRESSURE: 153 MMHG | DIASTOLIC BLOOD PRESSURE: 73 MMHG | HEIGHT: 70 IN | TEMPERATURE: 98.2 F | RESPIRATION RATE: 16 BRPM | HEART RATE: 102 BPM | BODY MASS INDEX: 24.62 KG/M2

## 2025-05-01 DIAGNOSIS — M99.01 SOMATIC DYSFUNCTION OF SPINE, CERVICAL: ICD-10-CM

## 2025-05-01 DIAGNOSIS — M99.08 RIB CAGE REGION SOMATIC DYSFUNCTION: ICD-10-CM

## 2025-05-01 DIAGNOSIS — F41.8 SITUATIONAL ANXIETY: Primary | ICD-10-CM

## 2025-05-01 DIAGNOSIS — M99.07 UPPER EXTREMITY SOMATIC DYSFUNCTION: ICD-10-CM

## 2025-05-01 DIAGNOSIS — M99.02 SOMATIC DYSFUNCTION OF SPINE, THORACIC: ICD-10-CM

## 2025-05-01 DIAGNOSIS — M99.00 SOMATIC DYSFUNCTION OF HEAD REGION: ICD-10-CM

## 2025-05-01 DIAGNOSIS — M99.03 SOMATIC DYSFUNCTION OF SPINE, LUMBAR: ICD-10-CM

## 2025-05-01 DIAGNOSIS — R03.0 ELEVATED BLOOD PRESSURE READING: Primary | ICD-10-CM

## 2025-05-01 PROCEDURE — 99213 OFFICE O/P EST LOW 20 MIN: CPT | Performed by: FAMILY MEDICINE

## 2025-05-01 PROCEDURE — 98927 OSTEOPATH MANJ 5-6 REGIONS: CPT | Performed by: FAMILY MEDICINE

## 2025-05-01 ASSESSMENT — ANXIETY QUESTIONNAIRES
GAD7 TOTAL SCORE: 1
IF YOU CHECKED OFF ANY PROBLEMS ON THIS QUESTIONNAIRE, HOW DIFFICULT HAVE THESE PROBLEMS MADE IT FOR YOU TO DO YOUR WORK, TAKE CARE OF THINGS AT HOME, OR GET ALONG WITH OTHER PEOPLE: NOT DIFFICULT AT ALL
5. BEING SO RESTLESS THAT IT IS HARD TO SIT STILL: NOT AT ALL
1. FEELING NERVOUS, ANXIOUS, OR ON EDGE: SEVERAL DAYS
3. WORRYING TOO MUCH ABOUT DIFFERENT THINGS: NOT AT ALL
2. NOT BEING ABLE TO STOP OR CONTROL WORRYING: NOT AT ALL
6. BECOMING EASILY ANNOYED OR IRRITABLE: NOT AT ALL
7. FEELING AFRAID AS IF SOMETHING AWFUL MIGHT HAPPEN: NOT AT ALL
4. TROUBLE RELAXING: NOT AT ALL

## 2025-05-01 NOTE — PROGRESS NOTES
[facility]  [address] [place] [address]  Phone: [phone]  Fax: [phone]  Date of Visit:  2025  Patient Name: Simon Loya   Patient :  1960     CHIEF COMPLAINT:     Simon Loya is a 65 y.o. male who presents today for an OMT visit to be evaluated for the following condition(s):  Chief Complaint   Patient presents with    Procedure     Anxiety r/t BP       HISTORY OF PRESENT ILLNESS     HPI    Patient presents for OMT evaluation of anxiety.  He has been seen for this before.  Last visit was 3/20/25. He does exercises classes and yoga. He found that OMT was helpful for the low back pain he was having, but unsure if it helped with anxiety.      Patient denies radiation of pain, numbness or tingling in the extremities, F/C, unintentional weight loss, and saddle anesthesia.    Would like to proceed with OMT.    I personally reviewed the patient's past medical history, current medications, allergies, surgical history, family history and social history.  Updates were made as necessary.    REVIEW OF SYSTEM      General: Denies feeling poorly, tired, fever, chills  Cardiovascular: Denies chest pain, lower extremity edema, palpitations  Respiratory: Denies cough, shortness of breath at rest  MSK: Denies back pain, arthralgia  Neuro: Denies dizziness, headache    REVIEWED INFORMATION      Allergies   Allergen Reactions    Mixed Ragweed Other (See Comments)     Congestion, runny nose    Watery eyes, stuffiness       Patient Active Problem List   Diagnosis    Impacted cerumen of both ears    Pure hypercholesterolemia    Atopic rhinitis       Past Medical History:   Diagnosis Date    Adult acne     Allergic rhinitis     Anxiety 2025    Dysuria     elevated blood pressure     Groin abscess     Hyperlipidemia     Inguinal ring laxity     Intermittent low back pain     UTI (urinary tract infection)        Past Surgical

## 2025-05-01 NOTE — PATIENT INSTRUCTIONS
Good to see you [email or web address]    I am enclosing the blood pressure validation site:    [email or web address]_[email or web address]_campaignid=[email or web address]    If you have any questions, please feel free to message me through [employer].    I would appreciate you taking a midmorning blood pressure, and evening blood pressure and a midday pressure for an average of 3 pressures in 1 week after you get your cuff.    Follow the deep breathing as we did here in the office.  Be sure that you have not had coffee within 30 minutes.    I will see you back in 3 months.

## 2025-06-24 ENCOUNTER — OFFICE VISIT (OUTPATIENT)
Age: 65
End: 2025-06-24
Payer: COMMERCIAL

## 2025-06-24 DIAGNOSIS — F41.8 SITUATIONAL ANXIETY: Primary | ICD-10-CM

## 2025-06-24 PROCEDURE — 90837 PSYTX W PT 60 MINUTES: CPT | Performed by: PSYCHOLOGIST

## 2025-06-27 NOTE — PROGRESS NOTES
HISTORY  Current living situation: with wife    Work/Education: retired - last several years on job were stressful    Support system: wife    Restorationism/Spirituality: not assessed    DRUG AND ALCOHOL CURRENT USE/HISTORY  TOBACCO:  He reports that he has never smoked. He has never used smokeless tobacco.  ALCOHOL:  He reports current alcohol use.  OTHER SUBSTANCES: He reports no history of drug use.     ASSESSMENT  Simon Loya presented to the appointment today for evaluation and treatment of symptoms of anxiety.   He will also benefit from brief and solution-focused consultation to address cognitive and behavioral interventions for anxiety symptoms.      DIAGNOSIS    ICD-10-CM    1. Situational anxiety  F41.8            INTERVENTION  Supportive techniques, reviewed coping skills - using AWARE, discussed Healing Back Pain by Joselyn - to see if connects to anger about job loss and back pain    PLAN  Patient stated that he was at a good stopping place - door open if he chooses to reture    Electronically signed by Javier Felder, PhD on 6/27/2025 at 3:56 PM

## 2025-08-25 ENCOUNTER — PATIENT MESSAGE (OUTPATIENT)
Age: 65
End: 2025-08-25

## 2025-08-25 ENCOUNTER — OFFICE VISIT (OUTPATIENT)
Age: 65
End: 2025-08-25
Payer: COMMERCIAL

## 2025-08-25 VITALS
BODY MASS INDEX: 23.91 KG/M2 | WEIGHT: 167 LBS | DIASTOLIC BLOOD PRESSURE: 80 MMHG | HEIGHT: 70 IN | TEMPERATURE: 97.5 F | HEART RATE: 82 BPM | RESPIRATION RATE: 16 BRPM | SYSTOLIC BLOOD PRESSURE: 159 MMHG

## 2025-08-25 DIAGNOSIS — R03.0 ELEVATED BLOOD PRESSURE READING: Primary | ICD-10-CM

## 2025-08-25 DIAGNOSIS — Z00.00 VISIT FOR WELL MAN HEALTH CHECK: ICD-10-CM

## 2025-08-25 PROCEDURE — 99213 OFFICE O/P EST LOW 20 MIN: CPT | Performed by: FAMILY MEDICINE

## 2025-08-25 RX ORDER — FLUTICASONE PROPIONATE 50 MCG
1 SPRAY, SUSPENSION (ML) NASAL DAILY PRN
COMMUNITY

## 2025-08-25 ASSESSMENT — PATIENT HEALTH QUESTIONNAIRE - PHQ9
SUM OF ALL RESPONSES TO PHQ QUESTIONS 1-9: 0
4. FEELING TIRED OR HAVING LITTLE ENERGY: NOT AT ALL
SUM OF ALL RESPONSES TO PHQ QUESTIONS 1-9: 0
9. THOUGHTS THAT YOU WOULD BE BETTER OFF DEAD, OR OF HURTING YOURSELF: NOT AT ALL
SUM OF ALL RESPONSES TO PHQ QUESTIONS 1-9: 0
10. IF YOU CHECKED OFF ANY PROBLEMS, HOW DIFFICULT HAVE THESE PROBLEMS MADE IT FOR YOU TO DO YOUR WORK, TAKE CARE OF THINGS AT HOME, OR GET ALONG WITH OTHER PEOPLE: NOT DIFFICULT AT ALL
SUM OF ALL RESPONSES TO PHQ QUESTIONS 1-9: 0
1. LITTLE INTEREST OR PLEASURE IN DOING THINGS: NOT AT ALL
6. FEELING BAD ABOUT YOURSELF - OR THAT YOU ARE A FAILURE OR HAVE LET YOURSELF OR YOUR FAMILY DOWN: NOT AT ALL
5. POOR APPETITE OR OVEREATING: NOT AT ALL
3. TROUBLE FALLING OR STAYING ASLEEP: NOT AT ALL
2. FEELING DOWN, DEPRESSED OR HOPELESS: NOT AT ALL
8. MOVING OR SPEAKING SO SLOWLY THAT OTHER PEOPLE COULD HAVE NOTICED. OR THE OPPOSITE, BEING SO FIGETY OR RESTLESS THAT YOU HAVE BEEN MOVING AROUND A LOT MORE THAN USUAL: NOT AT ALL
7. TROUBLE CONCENTRATING ON THINGS, SUCH AS READING THE NEWSPAPER OR WATCHING TELEVISION: NOT AT ALL

## 2025-08-25 ASSESSMENT — ENCOUNTER SYMPTOMS
GASTROINTESTINAL NEGATIVE: 1
RESPIRATORY NEGATIVE: 1